# Patient Record
Sex: FEMALE | Race: ASIAN | NOT HISPANIC OR LATINO | URBAN - METROPOLITAN AREA
[De-identification: names, ages, dates, MRNs, and addresses within clinical notes are randomized per-mention and may not be internally consistent; named-entity substitution may affect disease eponyms.]

---

## 2023-03-09 ENCOUNTER — EMERGENCY (EMERGENCY)
Facility: HOSPITAL | Age: 58
LOS: 1 days | Discharge: HOME | End: 2023-03-09
Attending: EMERGENCY MEDICINE | Admitting: EMERGENCY MEDICINE
Payer: COMMERCIAL

## 2023-03-09 VITALS
HEART RATE: 82 BPM | TEMPERATURE: 98 F | OXYGEN SATURATION: 99 % | DIASTOLIC BLOOD PRESSURE: 93 MMHG | RESPIRATION RATE: 16 BRPM | SYSTOLIC BLOOD PRESSURE: 157 MMHG

## 2023-03-09 LAB
APPEARANCE UR: CLEAR — SIGNIFICANT CHANGE UP
BACTERIA # UR AUTO: NEGATIVE — SIGNIFICANT CHANGE UP
BILIRUB UR-MCNC: NEGATIVE — SIGNIFICANT CHANGE UP
COLOR SPEC: COLORLESS — SIGNIFICANT CHANGE UP
DIFF PNL FLD: NEGATIVE — SIGNIFICANT CHANGE UP
EPI CELLS # UR: 5 /HPF — SIGNIFICANT CHANGE UP (ref 0–5)
GLUCOSE UR QL: NEGATIVE — SIGNIFICANT CHANGE UP
HYALINE CASTS # UR AUTO: 3 /LPF — SIGNIFICANT CHANGE UP (ref 0–7)
KETONES UR-MCNC: NEGATIVE — SIGNIFICANT CHANGE UP
LEUKOCYTE ESTERASE UR-ACNC: ABNORMAL
NITRITE UR-MCNC: NEGATIVE — SIGNIFICANT CHANGE UP
PH UR: 7 — SIGNIFICANT CHANGE UP (ref 5–8)
PROT UR-MCNC: NEGATIVE — SIGNIFICANT CHANGE UP
RBC CASTS # UR COMP ASSIST: 1 /HPF — SIGNIFICANT CHANGE UP (ref 0–4)
SP GR SPEC: 1.01 — LOW (ref 1.01–1.05)
UROBILINOGEN FLD QL: SIGNIFICANT CHANGE UP
WBC UR QL: 7 /HPF — HIGH (ref 0–5)

## 2023-03-09 PROCEDURE — 99284 EMERGENCY DEPT VISIT MOD MDM: CPT

## 2023-03-09 NOTE — ED PROVIDER NOTE - PHYSICAL EXAMINATION
GENERAL: well appearing in no acute distress, non-toxic appearing  HEAD: normocephalic, atraumatic  HEENT: normal conjunctiva, no JVD  CARDIAC: regular rate and rhythm, no appreciable murmurs, 2+ pulses in UE/LE b/l  PULM: normal breath sounds, clear to ascultation bilaterally, no rales, rhonchi, wheezing  GI: abdomen nondistended, soft, nontender, no guarding, rebound tenderness  :  no suprapubic tenderness  NEURO: no focal motor or sensory deficits, PERRLA, EOMI, normal gait, AAOx3  MSK: no peripheral edema, no calf tenderness b/l  SKIN: well-perfused, extremities warm, no visible rashes  PSYCH: appropriate mood and affect

## 2023-03-09 NOTE — ED PROVIDER NOTE - NSFOLLOWUPCLINICS_GEN_ALL_ED_FT
[facility]  Internal Medicine  [address] [place] [address]  Phone: [phone]  Fax:     [facility]  Cardiology  [address] [place] [address]  Phone: [phone]  Fax:     [facility]  General Internal Medicine  [address] [place] [address]  Phone: [phone]  Fax:     [facility]  Internal Medicine  [address] [place] [address]  Phone: [phone]  Fax: [phone]    [facility]  Cardiology  [address], [address]  [place] [address]  Phone: [phone]  Fax:     [facility]  Internal Medicine  [address] [place] [address]  Phone: [phone]  Fax: [phone]    [facility]  Internal Medicine  [address] [place] [address]  Phone: [phone]  Fax:

## 2023-03-09 NOTE — ED PROVIDER NOTE - PATIENT PORTAL LINK FT
You can access the FollowMyHealth Patient Portal offered by [facility] by registering at the following website: [email or web address]. By joining [employer]’s FollowMyHealth portal, you will also be able to view your health information using other applications (apps) compatible with our system.

## 2023-03-09 NOTE — ED PROVIDER NOTE - CLINICAL SUMMARY MEDICAL DECISION MAKING FREE TEXT BOX
58-year-old female presenting for evaluation of asymptomatic high blood pressure onset 2 weeks ago.  Patient's blood pressure today is 157/93, patient is without any evidence of hypertensive emergency or urgency on exam or review of systems at this time.  Patient is well-appearing, complaining of increased urinary frequency x3 days.  Patient is afebrile hemodynamically stable with no suprapubic tenderness on exam.  We will check a UA and culture.  Patient was counseled extensively on high blood pressure, patient already is taking amlodipine 5 mg for blood pressure management.  Patient will be given referral for PCP to follow-up with.  Patient counseled extensively on diet and exercise and lifestyle modifications to help high blood pressure.  Patient counseled on keeping blood pressure diary and taking blood pressure at times where she was calm and not after caffeine use.

## 2023-03-09 NOTE — ED ADULT TRIAGE NOTE - CHIEF COMPLAINT QUOTE
Pt c/o high blood pressure x 2 days. Pt recently started on BP medication 3 days ago. Denies headache, blurry vision, chest pain.

## 2023-03-09 NOTE — ED PROVIDER NOTE - PROGRESS NOTE DETAILS
Jossy Valdez, PGY1, MD: UA negative for infection, findings discussed with pt, pt cleared for dc home.

## 2023-03-09 NOTE — ED PROVIDER NOTE - NSFOLLOWUPINSTRUCTIONS_ED_ALL_ED_FT
Hypertension  WHAT YOU NEED TO KNOW:  Hypertension is high blood pressure. Your blood pressure is the force of your blood moving against the walls of your arteries. Hypertension causes your blood pressure to get so high that your heart has to work much harder than normal. This can damage your heart. The cause of hypertension may not be known. This is called essential or primary hypertension. Hypertension caused by another medical condition, such as kidney disease, is called secondary hypertension.  DISCHARGE INSTRUCTIONS:  Call 911 for any of the following:   •You have chest pain.  •You have any of the following signs of a heart attack: ?Squeezing, pressure, or pain in your chest  You may also have any of the following: Discomfort or pain in your back, neck, jaw, stomach, or arm  Shortness of breath  Nausea or vomiting  Lightheadedness or a sudden cold sweat  •You become confused or have difficulty speaking.  •You suddenly feel lightheaded or have trouble breathing.  Seek care immediately if:   •You have a severe headache or vision loss.  •You have weakness in an arm or leg.   Contact your healthcare provider if:   •You feel faint, dizzy, confused, or drowsy.  •You have been taking your blood pressure medicine but your pressure is higher than your provider says it should be.  •You have questions or concerns about your condition or care.  Medicines: You may need any of the following:   •Antihypertensives may be used to help lower your blood pressure. Several kinds of medicines are available. Your healthcare provider will choose medicines based on the kind of hypertension you have. You may need more than one type of medicine. Take the medicine exactly as directed.   •Diuretics help decrease extra fluid that collects in your body. This will help lower your blood pressure. You may urinate more often while you take this medicine.  •Cholesterol medicine helps lower your cholesterol level. A low cholesterol level helps prevent heart disease and makes it easier to control your blood pressure.  •Take your medicine as directed. Contact your healthcare provider if you think your medicine is not helping or if you have side effects. Tell him or her if you are allergic to any medicine. Keep a list of the medicines, vitamins, and herbs you take. Include the amounts, and when and why you take them. Bring the list or the pill bottles to follow-up visits. Carry your medicine list with you in case of an emergency.  Follow up with your healthcare provider as directed: You will need to return to have your blood pressure checked and to have other lab tests done. Write down your questions so you remember to ask them during your visits.   Stages of hypertension:   Blood Pressure Readings   •Normal blood pressure is 119/79 or lower. Your healthcare provider may only check your blood pressure each year if it stays at a normal level.  •Elevated blood pressure is 120/79 to 129/79. This is sometimes called prehypertension. Your healthcare provider may suggest lifestyle changes to help lower your blood pressure to a normal level. He or she may then check it again in 3 to 6 months,  •Stage 1 hypertension is 130/80 to 139/89. Your provider may recommend lifestyle changes, medication, and checks every 3 to 6 months until your blood pressure is controlled.  •Stage 2 hypertension is 140/90 or higher. Your provider will recommend lifestyle changes and have you take 2 kinds of hypertension medicines. You will also need to have your blood pressure checked monthly until it is controlled.  Manage hypertension:   •Check your blood pressure at home. Avoid smoking, caffeine, and exercise at least 30 minutes before checking your blood pressure. Sit and rest for 5 minutes before you take your blood pressure. Extend your arm and support it on a flat surface. Your arm should be at the same level as your heart. Follow the directions that came with your blood pressure monitor. Check your blood pressure 2 times, 1 minute apart, before you take your medicine in the morning. Also check your blood pressure before your evening meal. Keep a record of your readings and bring it to your follow-up visits. Ask your healthcare provider what your blood pressure should be.   How to take a Blood Pressure   •Manage any other health conditions you have. Health conditions such as diabetes can increase your risk for hypertension. Follow your healthcare provider's instructions and take all your medicines as directed.   •Ask about all medicines. Certain medicines can increase your blood pressure. Examples include oral birth control pills, decongestants, herbal supplements, and NSAIDs, such as ibuprofen. Your healthcare provider can tell you which medicines are safe for you to take. This includes prescription and over-the-counter medicines.  Lifestyle changes you can make to manage hypertension:   •Limit sodium (salt) as directed. Too much sodium can affect your fluid balance. Check labels to find low-sodium or no-salt-added foods. Some low-sodium foods use potassium salts for flavor. Too much potassium can also cause health problems. Your healthcare provider will tell you how much sodium and potassium are safe for you to have in a day. He or she may recommend that you limit sodium to 2,300 mg a day.  •Follow the meal plan recommended by your healthcare provider. A dietitian or your provider can give you more information on low-sodium plans or the DASH (Dietary Approaches to Stop Hypertension) eating plan. The DASH plan is low in sodium, unhealthy fats, and total fat. It is high in potassium, calcium, and fiber.   •Exercise to maintain a healthy weight. Exercise at least 30 minutes per day, on most days of the week. This will help decrease your blood pressure. Ask your healthcare provider about the best exercise plan for you.   Walking for Exercise   •Decrease stress. This may help lower your blood pressure. Learn ways to relax, such as deep breathing or listening to music.  •Limit alcohol as directed. Alcohol can increase your blood pressure. A drink of alcohol is 12 ounces of beer, 5 ounces of wine, or 1½ ounces of liquor.  •Do not smoke. Nicotine and other chemicals in cigarettes and cigars can increase your blood pressure and also cause lung damage. Ask your healthcare provider for information if you currently smoke and need help to quit. E-cigarettes or smokeless tobacco still contain nicotine. Talk to your healthcare provider before you use these products.   Prevent Heart Disease      DASH Eating Plan  WHAT YOU NEED TO KNOW:  The DASH (Dietary Approaches to Stop Hypertension) Eating Plan is designed to help prevent or lower high blood pressure. It can also help to lower LDL (bad) cholesterol and decrease your risk of heart disease. The plan is low in sodium, sugar, unhealthy fats, and total fat. It is high in potassium, calcium, magnesium, and fiber. These nutrients are added when you eat more fruits, vegetables, and whole grains.   DISCHARGE INSTRUCTIONS:  Your sodium limit each day: Your dietitian will tell you how much sodium is safe for you to have each day. People with high blood pressure should have no more than 1,500 to 2,300 mg of sodium in a day. A teaspoon (tsp) of salt has 2,300 mg of sodium. This may seem like a difficult goal, but small changes to the foods you eat can make a big difference. Your healthcare provider or dietitian can help you create a meal plan that follows your sodium limit.  How to limit sodium:   •Read food labels. Food labels can help you choose foods that are low in sodium. The amount of sodium is listed in milligrams (mg). The % Daily Value (DV) column tells you how much of your daily needs are met by 1 serving of the food for each nutrient listed. Choose foods that have less than 5% of the DV of sodium. These foods are considered low in sodium. Foods that have 20% or more of the DV of sodium are considered high in sodium. Avoid foods that have more than 300 mg of sodium in each serving. Choose foods that say low-sodium, reduced-sodium, or no salt added on the food label.  •Avoid salt. Do not salt food at the table, and add very little salt to foods during cooking. Use herbs and spices, such as onions, garlic, and salt-free seasonings to add flavor to foods. Try lemon or lime juice or vinegar to give foods a tart flavor. Use hot peppers or a small amount of hot pepper sauce to add a spicy flavor to foods.   •Ask about salt substitutes. Ask your healthcare provider if you may use salt substitutes. Some salt substitutes have ingredients that can be harmful if you have certain health conditions.  •Choose foods carefully at restaurants. Meals from restaurants, especially fast food restaurants, are often high in sodium. Some restaurants have nutrition information that tells you the amount of sodium in their foods. Ask to have your food prepared with less, or no salt.   What you need to know about fats:   •Include healthy fats. Examples are unsaturated fats and omega-3 fatty acids. Unsaturated fats are found in soybean, canola, olive, or sunflower oil, and liquid and soft tub margarines. Omega-3 fatty acids are found in fatty fish, such as salmon, tuna, mackerel, and sardines. It is also found in flaxseed oil and ground flaxseed.   Sources of Omega 3   •Avoid unhealthy fats. Do not eat unhealthy fats, such as saturated fats and trans fats. Saturated fats are found in foods that contain fat from animals. Examples are fatty meats, whole milk, butter, cream, and other dairy foods. It is also found in shortening, stick margarine, palm oil, and coconut oil. Trans fats are found in fried foods, crackers, chips, and baked goods made with margarine or shortening.   Foods to include: With the DASH eating plan, you need to eat a certain number of servings from each food group. This will help you get enough of certain nutrients and limit others. The amount of servings you should eat depends on how many calories you need. Your dietitian can tell you how many calories you need. The number of servings listed next to the food groups below are for people who need about 2,000 calories each day.   •Grains: 6 to 8 servings (3 of these servings should be whole-grain foods)?1 slice of whole-grain bread   ?1 ounce of dry cereal  ?½ cup of cooked cereal, pasta, or brown rice  •Vegetables and fruits: 4 to 5 servings of fruits and 4 to 5 servings of vegetables?1 medium fruit  ?½ cup of frozen, canned (no added salt), or chopped fresh vegetables   ?½ cup of fresh, frozen, dried, or canned fruit (canned in light syrup or fruit juice)  ?½ cup of vegetable or fruit juice  •Dairy: 2 to 3 servings?1 cup of nonfat (skim) or 1% milk  ?1½ ounces of fat-free or low-fat cheese  ?6 ounces of nonfat or low-fat yogurt  •Lean meat, poultry, and fish: 6 ounces or less?Poultry (chicken, turkey) with no skin  ?Fish (especially fatty fish, such as salmon, fresh tuna, or mackerel)  ?Lean beef and pork (loin, round, extra lean hamburger)  ?Egg whites and egg substitutes  •Nuts, seeds, and legumes: 4 to 5 servings each week?½ cup of cooked beans and peas  ?1½ ounces of unsalted nuts  ?2 tablespoons of peanut butter or seeds  •Sweets and added sugars: 5 or less each week?1 tablespoon of sugar, jelly, or jam  ?½ cup of sorbet or gelatin  ?1 cup of lemonade  •Fats: 2 to 3 servings each week?1 teaspoon of soft margarine or vegetable oil  ?1 tablespoon of mayonnaise  ?2 tablespoons of salad dressing    Foods to avoid:   •Grains: ?Baked goods, such as doughnuts, pastries, cookies, and biscuits (high in fat and sugar)  ?Mixes for cornbread and biscuits, packaged foods, such as bread stuffing, rice and pasta mixes, macaroni and cheese, and instant cereals (high in sodium)  •Fruits and vegetables: ?Regular, canned vegetables (high in sodium)  ?Sauerkraut, pickled vegetables, and other foods prepared in brine (high in sodium)  ?Fried vegetables or vegetables in butter or high-fat sauces  ?Fruit in cream or butter sauce (high in fat)  •Dairy: ?Whole milk, 2% milk, and cream (high in fat)  ?Regular cheese and processed cheese (high in fat and sodium  •Meats and protein foods: ?Smoked or cured meat, such as corned beef, olivo, ham, hot dogs, and sausage (high in fat and sodium)  ?Canned beans and canned meats or spreads, such as potted meats, sardines, anchovies, and imitation seafood (high in sodium)  -Deli or lunch meats, such as bologna, ham, turkey, and roast beef (high in sodium)  -High-fat meat (T-bone steak, regular hamburger, and ribs)  -Whole eggs and egg yolks (high in fat)  •Other: ?Seasonings made with salt, such as garlic salt, celery salt, onion salt, seasoned salt, meat tenderizers, and monosodium glutamate (MSG)  -Miso soup and canned or dried soup mixes (high in sodium)  -Regular soy sauce, barbecue sauce, teriyaki sauce, steak sauce, Worcestershire sauce, and most flavored vinegars (high in sodium)  -Regular condiments, such as mustard, ketchup, and salad dressings (high in sodium)  -Gravy and sauces, such as Iraj or cheese sauces (high in sodium and fat)  -Drinks high in sugar, such as soda or fruit drinks  -Snack foods, such as salted chips, popcorn, pretzels, pork rinds, salted crackers, and salted nuts  -Frozen foods, such as dinners, entrees, vegetables with sauces, and breaded meats (high in sodium)  Where can I find more information?   •[employer] [facility] [place]  [facility] Information Center  [address] [address]  [place] [address]  Phone: [phone]  Web Address: http://www.nhlbi.nih.gov/health/infoctr/index.htm

## 2023-03-09 NOTE — ED PROVIDER NOTE - ATTENDING CONTRIBUTION TO CARE
58-year-old female no past medical history presenting for evaluation of asymptomatic high blood pressure for the last 2 weeks.  Son at bedside states that they have a neighbor who is a doctor, their neighbor advised them to see a doctor if the patient's blood pressure was higher than 180 systolic.  Patient reports that normally her pressures are in the 140s to 150s systolic, however when she checked her pressure the last few days she has been in the 160s to 180s.  Patient denies headache, blurry vision, chest pain, shortness of breath, lower leg swelling, nausea, vomiting, abdominal pain, weakness, dizziness.  Patient was prescribed amlodipine 5 mg once daily which she only has taken once.  Patient does not have a primary care doctor.  Patient does endorse urinary frequency x3 days, denies hematuria, dysuria, pelvic pain, fevers. pt does not think she has UTI.

## 2023-03-09 NOTE — ED ADULT NURSE NOTE - OBJECTIVE STATEMENT
Patient received in stretcher. AOX4. Respirations even and unlabored. Spontaneous movement of all extremities noted. Presents to ER c/o high blood pressure x2 days. As per patient family at bedside her BP is fluctuating between 160s- 170s systolic. Denies CP or SOB, numbness/ tingling of fingers or toes, blurry vision or HA. Evaluated by ER MD. Comfort and safety maintained. All current care needs met. Care plan continued Haritha GOLD

## 2023-03-09 NOTE — ED PROVIDER NOTE - NSFOLLOWUPCLINICSTOKEN_GEN_ALL_ED_FT
[id number]: || ||00\01||False;[id number]: || ||00\01||False;[id number]: || ||00\01||False;[id number]: || ||00\01||False;[id number]: || ||00\01||False;[id number]: || ||00\01||False;[id number]: || ||00\01||False;

## 2023-03-11 LAB
CULTURE RESULTS: SIGNIFICANT CHANGE UP
SPECIMEN SOURCE: SIGNIFICANT CHANGE UP

## 2023-12-14 NOTE — ED PROVIDER NOTE - INTERNATIONAL TRAVEL
Type of surgery: ARTHROSCOPY, KNEE meniscal and chondral debridement (Left)   Location of surgery: [facility]  Date and time of surgery:12/19/2023  Surgeon: Floyd   Pre-Op Appt Date: 12/15/2023  Post-Op Appt Date: 01/04/2024   Packet sent out: Yes  Pre-cert/Authorization completed:  No  Date:     No